# Patient Record
Sex: MALE | Employment: STUDENT | URBAN - METROPOLITAN AREA
[De-identification: names, ages, dates, MRNs, and addresses within clinical notes are randomized per-mention and may not be internally consistent; named-entity substitution may affect disease eponyms.]

---

## 2019-10-28 ENCOUNTER — MEDICAL CORRESPONDENCE (OUTPATIENT)
Dept: HEALTH INFORMATION MANAGEMENT | Facility: CLINIC | Age: 19
End: 2019-10-28

## 2019-10-28 ENCOUNTER — TRANSFERRED RECORDS (OUTPATIENT)
Dept: HEALTH INFORMATION MANAGEMENT | Facility: CLINIC | Age: 19
End: 2019-10-28

## 2019-11-27 ENCOUNTER — OFFICE VISIT (OUTPATIENT)
Dept: DERMATOLOGY | Facility: CLINIC | Age: 19
End: 2019-11-27
Payer: COMMERCIAL

## 2019-11-27 DIAGNOSIS — L72.9 CYST OF SKIN: Primary | ICD-10-CM

## 2019-11-27 ASSESSMENT — PAIN SCALES - GENERAL: PAINLEVEL: NO PAIN (0)

## 2019-11-27 NOTE — NURSING NOTE
Dermatology Rooming Note    Vlad Rolle's goals for this visit include:   Chief Complaint   Patient presents with     Derm Problem     Vlad states he had a bump on his lip, he notes the area has gotten smaller.      Ida Coronado LPN

## 2019-11-27 NOTE — LETTER
11/27/2019       RE: Vlad Rolle  [address]  [facility] [address]     Dear Colleague,    Thank you for referring your patient, Vlad Rolle, to the [facility] DERMATOLOGY at [facility]. Please see a copy of my visit note below.    [facility] Dermatology Note      Dermatology Problem List:  1. Cyst, right upper lip   - clinical monitoring    Encounter Date: Nov 27, 2019    CC:  Chief Complaint   Patient presents with     Derm Problem     Vlad states he had a bump on his lip, he notes the area has gotten smaller.      History of Present Illness:  Mr. Vlad Rolle is a 19 year old male who presents today in referral from Dr. Malissa MIR for a cyst evaluation.     Today he reports a cyst on his upper lip for the last 5-6 months. He was recently seen at [facility] for this lesion, who referred him here for further removal. This lesion has significantly decreased in size since this visit. He is only able to see the small white spot if he manipulates the area. Denies popping the area. He has been trying to avoid touching the area.  Applied a baking soda/water mixture to this area daily after his [place] visit.       Otherwise he is feeling well, without additional skin concerns.     Past Medical History:   There is no problem list on file for this patient.    No past medical history on file.  No past surgical history on file.    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   His sister has a history of skin cancer, likely NMSC    Medications:  No current outpatient medications on file.       Allergies   Allergen Reactions     Penicillins Hives       Review of Systems:  -Constitutional: Otherwise feeling well today, in usual state of health.  -HEENT: Patient denies nonhealing oral sores.  -Skin: As above in HPI. No additional skin concerns.    Physical  exam:  Vitals: There were no vitals taken for this visit.  GEN: This is a well developed, well-nourished male in no acute distress, in a pleasant mood.    SKIN: Focused examination of the face, neck and hands was performed. Significant for:   - There is a barely visible 1-2 mm skin colored papule, with faint evidence of central comedone on the right upper lip on the [place] border  -There are a few white 1 mm papules on the upper lips.  -No other lesions of concern on areas examined.       Impression/Plan:  1. Cyst, right upper lip  - Cyst management options were reviewed. The patient elects for clinical monitoring.  - Return to clinic if lesion flares, and becomes bothersome- will consider ILK injections or oral antibiotics    2. Scarlet spots, upper lip, reassurance given.     [facility] [id number] on close of this encounter.  Follow-up PRN     Staff Involved:  Scribe/Staff    Scribe Disclosure:   NICK, Any Bianchi, am serving as a scribe to document services personally performed by Heidi Herrera PA-C, based on data collection and the provider's statements to me.      Provider Disclosure:   The documentation recorded by the scribe accurately reflects the services I personally performed and the decisions made by me.    All risks, benefits and alternatives were discussed with patient.  Patient is in agreement and understands the assessment and plan.  All questions were answered.  Sun Screen Education was given.   Return to Clinic as needed.   Heidi Herrera PA-C   [facility]

## 2020-07-07 ENCOUNTER — NURSE TRIAGE (OUTPATIENT)
Dept: NURSING | Facility: CLINIC | Age: 20
End: 2020-07-07

## 2020-07-07 NOTE — TELEPHONE ENCOUNTER
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     Per protocol - advised [email or web address] evaluation.    COVID 19 Nurse Triage Plan/Patient Instructions    Please be aware that novel coronavirus (COVID-19) may be circulating in the community. If you develop symptoms such as fever, cough, or SOB or if you have concerns about the presence of another infection including coronavirus (COVID-19), please contact your health care provider or visit [email or web address].     Disposition/Instructions    Patient to schedule a Virtual Visit with provider. Reference Visit Selection Guide.    Thank you for taking steps to prevent the spread of this virus.  o Limit your contact with others.  o Wear a simple mask to cover your cough.  o Wash your hands well and often.    Resources    [facility]: About COVID-19: [email or web address]    CDC: What to Do If You're Sick: www.cdc.gov/coronavirus/2019-ncov/about/steps-when-sick.html    CDC: Ending Home Isolation: www.cdc.gov/coronavirus/2019-ncov/hcp/disposition-in-home-patients.html     CDC: Caring for Someone: www.cdc.gov/coronavirus/2019-ncov/if-you-are-sick/care-for-someone.html     [facility]: Interim Guidance for Hospital Discharge to Home: www.[facility].mn.us/diseases/coronavirus/hcp/hospdischarge.pdf    [facility] clinical trials (COVID-19 research studies): [email or web address].[email or web address]-clinical-trials     Below are the COVID-19 hotlines at the [facility] ([facility]). Interpreters are available.   o For health questions: Call 275-474-5133 or 1-849.126.2864 (7 a.m. to 7 p.m.)  o For questions about schools and childcare: Call 615-091-3031 or 1-490.596.1867 (7 a.m. to 7 p.m.)       Aye Phillip RN on 7/7/2020 at 5:38 PM    Reason for Disposition    [1] COVID-19 EXPOSURE (Close Contact) within last 14 days AND [2] needs COVID-19 lab test to return to work AND [3] NO symptoms    Additional Information     Negative: COVID-19 has been diagnosed by a healthcare provider (HCP)    Negative: COVID-19 lab test positive    Negative: [1] Symptoms of COVID-19 (e.g., cough, fever, SOB, or others) AND [2] lives in an area with community spread    Negative: [1] Symptoms of COVID-19 (e.g., cough, fever, SOB, or others) AND [2] within 14 days of EXPOSURE (close contact) with diagnosed or suspected COVID-19 patient    Negative: [1] Symptoms of COVID-19 (e.g., cough, fever, SOB, or others) AND [2] within 14 days of travel from high-risk area for COVID-19 community spread (identified by CDC)    Negative: [1] Difficulty breathing (shortness of breath) occurs AND [2] onset > 14 days after COVID-19 EXPOSURE (Close Contact) AND [3] no community spread where patient lives    Negative: [1] Dry cough occurs AND [2] onset > 14 days after COVID-19 EXPOSURE AND [3] no community spread where patient lives    Negative: [1] Wet cough (i.e., white-yellow, yellow, green, or radha colored sputum) AND [2] onset > 14 days after COVID-19 EXPOSURE AND [3] no community spread where patient lives    Negative: [1] Common cold symptoms AND [2] onset > 14 days after COVID-19 EXPOSURE AND [3] no community spread where patient lives    Protocols used: CORONAVIRUS (COVID-19) EXPOSURE-A- 5.16.20